# Patient Record
Sex: MALE | Race: WHITE | NOT HISPANIC OR LATINO | Employment: FULL TIME | URBAN - NONMETROPOLITAN AREA
[De-identification: names, ages, dates, MRNs, and addresses within clinical notes are randomized per-mention and may not be internally consistent; named-entity substitution may affect disease eponyms.]

---

## 2020-12-27 ENCOUNTER — OFFICE VISIT (OUTPATIENT)
Dept: URGENT CARE | Facility: PHYSICIAN GROUP | Age: 55
End: 2020-12-27
Payer: COMMERCIAL

## 2020-12-27 ENCOUNTER — HOSPITAL ENCOUNTER (OUTPATIENT)
Facility: MEDICAL CENTER | Age: 55
End: 2020-12-27
Attending: PHYSICIAN ASSISTANT
Payer: COMMERCIAL

## 2020-12-27 VITALS
BODY MASS INDEX: 21.05 KG/M2 | HEART RATE: 83 BPM | DIASTOLIC BLOOD PRESSURE: 74 MMHG | OXYGEN SATURATION: 98 % | RESPIRATION RATE: 12 BRPM | SYSTOLIC BLOOD PRESSURE: 114 MMHG | TEMPERATURE: 98.4 F | HEIGHT: 74 IN | WEIGHT: 164 LBS

## 2020-12-27 DIAGNOSIS — Z20.822 CLOSE EXPOSURE TO COVID-19 VIRUS: ICD-10-CM

## 2020-12-27 LAB — COVID ORDER STATUS COVID19: NORMAL

## 2020-12-27 PROCEDURE — 99202 OFFICE O/P NEW SF 15 MIN: CPT | Mod: CS | Performed by: PHYSICIAN ASSISTANT

## 2020-12-27 PROCEDURE — U0003 INFECTIOUS AGENT DETECTION BY NUCLEIC ACID (DNA OR RNA); SEVERE ACUTE RESPIRATORY SYNDROME CORONAVIRUS 2 (SARS-COV-2) (CORONAVIRUS DISEASE [COVID-19]), AMPLIFIED PROBE TECHNIQUE, MAKING USE OF HIGH THROUGHPUT TECHNOLOGIES AS DESCRIBED BY CMS-2020-01-R: HCPCS

## 2020-12-27 ASSESSMENT — PAIN SCALES - GENERAL: PAINLEVEL: NO PAIN

## 2020-12-27 NOTE — LETTER
December 27, 2020         Patient: Phoenix Russell   YOB: 1965   Date of Visit: 12/27/2020           To Whom it May Concern:    Phoenix Russell was seen in my clinic on 12/27/2020.     Please excuse patient for missing school/work until COVID results are available, typically taking 1-3 days.  They have been advised to quarantine during this time.      If you have any questions or concerns, please don't hesitate to call.        Sincerely,           Lesly Hutchison P.A.-C.  Electronically Signed

## 2020-12-27 NOTE — PROGRESS NOTES
"Chief Complaint   Patient presents with   • Coronavirus Screening     Exposed, No sx, needs clearance for work        HISTORY OF PRESENT ILLNESS: Patient is a 55 y.o. male who presents today for the following:    COVID exposure 5 days ago  Asymptomatic  Needs test to go back to work    There are no active problems to display for this patient.      Allergies:Patient has no known allergies.    No current Epic-ordered outpatient medications on file.     No current Epic-ordered facility-administered medications on file.        History reviewed. No pertinent past medical history.    Social History     Tobacco Use   • Smoking status: Current Some Day Smoker   • Smokeless tobacco: Never Used   Substance Use Topics   • Alcohol use: Yes   • Drug use: Not on file       No family status information on file.   History reviewed. No pertinent family history.    Review of Systems:   Constitutional ROS: No unexpected change in weight, No weakness, No fatigue  Eye ROS: No recent significant change in vision, No eye pain, redness, discharge  Ear ROS: No drainage, No tinnitus or vertigo, No recent change in hearing  Mouth/Throat ROS: No teeth or gum problems, No bleeding gums, No tongue complaints  Neck ROS: No swollen glands, No significant pain in neck  Pulmonary ROS: No chronic cough, sputum, or hemoptysis, No dyspnea on exertion, No wheezing  Cardiovascular ROS: No diaphoresis, No edema, No palpitations  Musculoskeletal/Extremities ROS: No peripheral edema, No pain, redness or swelling on the joints  Hematologic/Lymphatic ROS: No chills, No night sweats, No weight loss  Skin/Integumentary ROS: No edema, No evidence of rash, No itching      Exam:  /74   Pulse 83   Temp 36.9 °C (98.4 °F) (Temporal)   Resp 12   Ht 1.88 m (6' 2\")   Wt 74.4 kg (164 lb)   SpO2 98%   General: Well developed, well nourished. No distress.    Eye: PERRL/EOMI; conjunctivae clear, lids normal.  ENMT: Lips without lesions, MMM. Oropharynx is clear. " Bilateral TMs are within normal limits.  Pulmonary: Unlabored respiratory effort. Lungs clear to auscultation, no wheezes, no rhonchi.    Cardiovascular: Regular rate and rhythm without murmur.   Neurologic: Grossly nonfocal. No facial asymmetry noted.  Lymph: No cervical lymphadenopathy noted.  Skin: Warm, dry, good turgor. No rashes in visible areas.   Psych: Normal mood. Alert and oriented to person, place and time.    Assessment/Plan:  Patient will be tested for COVID and has been advised to quarantine until results are available. Encouraged patient to [facility] Sign up information was sent via text message.   1. Close exposure to COVID-19 virus  COVID/SARS COV-2 PCR

## 2020-12-28 LAB
SARS-COV-2 RNA RESP QL NAA+PROBE: NOTDETECTED
SPECIMEN SOURCE: NORMAL